# Patient Record
Sex: FEMALE | Race: OTHER | Employment: OTHER | URBAN - METROPOLITAN AREA
[De-identification: names, ages, dates, MRNs, and addresses within clinical notes are randomized per-mention and may not be internally consistent; named-entity substitution may affect disease eponyms.]

---

## 2017-12-19 NOTE — PATIENT DISCUSSION
Pinguecula Counseling:  I have explained to the patient at length the diagnosis of pinguecula and its pathophysiology. I recommended the patient adequately protect their eyes from excessive UV light and dry, roseline conditions. The use of artificial tears in dry conditions was encouraged. Return for follow-up as scheduled.

## 2017-12-19 NOTE — PATIENT DISCUSSION
TIMOTHY OU:  PRESCRIBE ARTIFICIAL TEARS BID - QID. DECREASE OUTDOOR EXPOSURE AND USE UV PROTECTION/ SUNGLASSES WITH OUTDOOR ACTIVITIES. RETURN FOR FOLLOW UP AS SCHEDULED.

## 2019-05-16 NOTE — PATIENT DISCUSSION
MYOPIA, OU- DISC OPT OF REFRACTIVE SX-VS-GLS/[id number]. PT UNDERSTANDS OPTIONS AND DESIRES TO PROCEED WITH LASIK REFRACTIVE SURGERY TO IMPROVE VA AND REDUCE DEPENDENCY ON GLS/CTLS.

## 2019-05-16 NOTE — PATIENT DISCUSSION
New Prescription: gatifloxacin (gatifloxacin): drops: 0.5% 1 drop four times a day into both eyes 05-

## 2019-05-16 NOTE — PATIENT DISCUSSION
New Prescription: prednisolone acetate (prednisolone acetate): drops,suspension: 1% 1 drop four times a day as directed into both eyes 05-

## 2019-05-17 NOTE — PATIENT DISCUSSION
Continue: prednisolone acetate (prednisolone acetate): drops,suspension: 1% 1 drop four times a day as directed into both eyes 05-

## 2022-07-12 ENCOUNTER — PREPPED CHART (OUTPATIENT)
Dept: URBAN - METROPOLITAN AREA CLINIC 49 | Facility: CLINIC | Age: 72
End: 2022-07-12

## 2022-11-04 ENCOUNTER — NEW PATIENT (OUTPATIENT)
Dept: URBAN - METROPOLITAN AREA CLINIC 50 | Facility: CLINIC | Age: 72
End: 2022-11-04

## 2022-11-04 DIAGNOSIS — H26.493: ICD-10-CM

## 2022-11-04 DIAGNOSIS — E11.9: ICD-10-CM

## 2022-11-04 DIAGNOSIS — H40.013: ICD-10-CM

## 2022-11-04 DIAGNOSIS — D31.31: ICD-10-CM

## 2022-11-04 DIAGNOSIS — H35.371: ICD-10-CM

## 2022-11-04 PROCEDURE — 92004 COMPRE OPH EXAM NEW PT 1/>: CPT

## 2022-11-04 ASSESSMENT — TONOMETRY
OS_IOP_MMHG: 16
OD_IOP_MMHG: 15

## 2022-11-04 ASSESSMENT — VISUAL ACUITY
OU_SC: 20/25+1
OS_GLARE: 20/30
OD_GLARE: 20/20
OS_GLARE: 20/20
OU_SC: J3@16"
OD_GLARE: 20/25
OD_SC: 20/40-1
OS_SC: 20/25-1
OD_PH: 20/25-1

## 2022-11-04 ASSESSMENT — KERATOMETRY
OD_AXISANGLE2_DEGREES: 84
OS_K1POWER_DIOPTERS: 44.50
OS_AXISANGLE_DEGREES: 180
OD_K1POWER_DIOPTERS: 44.00
OD_AXISANGLE_DEGREES: 174
OS_AXISANGLE2_DEGREES: 90
OD_K2POWER_DIOPTERS: 45.50
OS_K2POWER_DIOPTERS: 44.50

## 2023-06-30 ENCOUNTER — FOLLOW UP (OUTPATIENT)
Dept: URBAN - METROPOLITAN AREA CLINIC 50 | Facility: CLINIC | Age: 73
End: 2023-06-30

## 2023-06-30 DIAGNOSIS — H40.1132: ICD-10-CM

## 2023-06-30 PROCEDURE — 92012 INTRM OPH EXAM EST PATIENT: CPT

## 2023-06-30 PROCEDURE — 76514 ECHO EXAM OF EYE THICKNESS: CPT

## 2023-06-30 ASSESSMENT — TONOMETRY
OS_IOP_MMHG: 14
OS_IOP_MMHG: 14
OD_IOP_MMHG: 14
OD_IOP_MMHG: 14

## 2023-06-30 ASSESSMENT — VISUAL ACUITY
OS_SC: 20/25-2
OD_PH: 20/25
OD_SC: 20/30-1
OU_SC: 20/30+1

## 2023-06-30 ASSESSMENT — PACHYMETRY
OD_CT_UM: 557
OS_CT_UM: 548

## 2023-08-25 NOTE — PATIENT DISCUSSION
[facility] - Daily wearOD-0.75+1.11690+[id number]/30&nbsp;SN &nbsp; &nbsp; Test Date:    2023-08-24               Test Time:    12:11:51

Technician:   MJ                                    

                                                     

MEASUREMENT RESULTS:                                       

Intervals:                                           

Rate:         75                                     

TN:           196                                    

QRSD:         98                                     

QT:           416                                    

QTc:          464                                    

Axis:                                                

P:            55                                     

TN:           196                                    

QRS:          23                                     

T:            33                                     

                                                     

INTERPRETIVE STATEMENTS:                                       

                                                     

Sinus rhythm with occasional premature ventricular complexes

Otherwise normal ECG

Compared to ECG 01/05/2023 14:42:33

Ventricular premature complex(es) now present



Electronically Signed On 08-25-23 15:21:28 CDT by Carlos Palacios

## 2023-11-17 ENCOUNTER — COMPREHENSIVE EXAM (OUTPATIENT)
Dept: URBAN - METROPOLITAN AREA CLINIC 50 | Facility: LOCATION | Age: 73
End: 2023-11-17

## 2023-11-17 DIAGNOSIS — H52.4: ICD-10-CM

## 2023-11-17 DIAGNOSIS — H35.033: ICD-10-CM

## 2023-11-17 DIAGNOSIS — H40.1132: ICD-10-CM

## 2023-11-17 DIAGNOSIS — E11.9: ICD-10-CM

## 2023-11-17 DIAGNOSIS — D31.31: ICD-10-CM

## 2023-11-17 DIAGNOSIS — H35.371: ICD-10-CM

## 2023-11-17 DIAGNOSIS — H26.493: ICD-10-CM

## 2023-11-17 PROCEDURE — 99214 OFFICE O/P EST MOD 30 MIN: CPT

## 2023-11-17 PROCEDURE — 92134 CPTRZ OPH DX IMG PST SGM RTA: CPT

## 2023-11-17 PROCEDURE — 92015 DETERMINE REFRACTIVE STATE: CPT

## 2023-11-17 ASSESSMENT — KERATOMETRY
OD_AXISANGLE2_DEGREES: 59
OS_AXISANGLE_DEGREES: 003
OD_K2POWER_DIOPTERS: 44.25
OD_K1POWER_DIOPTERS: 44.00
OD_AXISANGLE_DEGREES: 149
OS_K1POWER_DIOPTERS: 44.75
OS_AXISANGLE2_DEGREES: 93
OS_K2POWER_DIOPTERS: 45.00

## 2023-11-17 ASSESSMENT — TONOMETRY
OS_IOP_MMHG: 15
OD_IOP_MMHG: 14
OD_IOP_MMHG: 14
OS_IOP_MMHG: 15

## 2023-11-17 ASSESSMENT — VISUAL ACUITY
OD_GLARE: 20/30
OS_SC: 20/25-2
OU_SC: 20/25
OS_GLARE: 20/40
OU_SC: J2@16"
OD_SC: 20/30
OD_GLARE: 20/40
OS_GLARE: 20/30

## 2024-05-17 ENCOUNTER — FOLLOW UP (OUTPATIENT)
Dept: URBAN - METROPOLITAN AREA CLINIC 50 | Facility: LOCATION | Age: 74
End: 2024-05-17

## 2024-05-17 DIAGNOSIS — H26.493: ICD-10-CM

## 2024-05-17 DIAGNOSIS — H40.1132: ICD-10-CM

## 2024-05-17 PROCEDURE — 92133 CPTRZD OPH DX IMG PST SGM ON: CPT

## 2024-05-17 PROCEDURE — 92083 EXTENDED VISUAL FIELD XM: CPT

## 2024-05-17 PROCEDURE — 99214 OFFICE O/P EST MOD 30 MIN: CPT

## 2024-05-17 ASSESSMENT — TONOMETRY
OS_IOP_MMHG: 15
OD_IOP_MMHG: 14
OS_IOP_MMHG: 15
OD_IOP_MMHG: 14

## 2024-05-17 ASSESSMENT — VISUAL ACUITY
OD_SC: 20/25
OU_SC: 20/25
OS_SC: 20/25